# Patient Record
Sex: FEMALE | Race: AMERICAN INDIAN OR ALASKA NATIVE
[De-identification: names, ages, dates, MRNs, and addresses within clinical notes are randomized per-mention and may not be internally consistent; named-entity substitution may affect disease eponyms.]

---

## 2022-06-07 ENCOUNTER — HOSPITAL ENCOUNTER (EMERGENCY)
Dept: HOSPITAL 5 - ED | Age: 57
Discharge: HOME | End: 2022-06-07
Payer: SELF-PAY

## 2022-06-07 VITALS — DIASTOLIC BLOOD PRESSURE: 75 MMHG | SYSTOLIC BLOOD PRESSURE: 156 MMHG

## 2022-06-07 DIAGNOSIS — Z98.890: ICD-10-CM

## 2022-06-07 DIAGNOSIS — Z88.1: ICD-10-CM

## 2022-06-07 DIAGNOSIS — Z00.00: Primary | ICD-10-CM

## 2022-06-07 DIAGNOSIS — I26.99: ICD-10-CM

## 2022-06-07 DIAGNOSIS — I82.409: ICD-10-CM

## 2022-06-07 PROCEDURE — 99281 EMR DPT VST MAYX REQ PHY/QHP: CPT

## 2022-06-07 NOTE — EMERGENCY DEPARTMENT REPORT
ED Recheck HPI





- General


Chief Complaint: Eye Problems


Stated Complaint: REF BY MONICA EYES NON ACTIVE TO LIGHT


Time Seen by Provider: 06/07/22 14:27


Source: patient


Mode of arrival: Ambulatory


Limitations: No Limitations





- History of Present Illness


Initial Comments: 





Patient is sent to the emergency room by the plasma donation clinic because her 

pupils did not react on their exam.





Home medications none.  Denies alcohol or drugs.  Denies any head trauma.





Patient states she went to donate and they could not get her pupils to react.  

Patient has dark brown eyes and dark pupils, she is -American.





- Related Data


                                  Previous Rx's











 Medication  Instructions  Recorded  Last Taken  Type


 


Permethrin 5% [Acticin 5% CREAM] 1 applicatio TP ONCE #60 gram 12/20/13 06/16/14

Rx


 


Cefuroxime Axetil [Ceftin] 500 mg PO Q12H #20 tablet 07/11/14 Unknown Rx


 


Pantoprazole [Protonix TAB] 40 mg PO DAILY #30 tablet 07/11/14 Unknown Rx


 


Sucralfate [Carafate] 1 gm PO ACHS #30 tablet 07/11/14 Unknown Rx


 


Warfarin [Coumadin] 6 mg PO DAILY@1700 #30 tablet 07/11/14 Unknown Rx


 


Ibuprofen [Motrin 600 MG tab] 600 mg PO Q8H PRN #30 tablet 08/27/17 Unknown Rx


 


Itraconazole [Onmel] 200 mg PO QDAY #7 tab 08/27/17 Unknown Rx


 


Ketoconazole (Nf) [Ketoconazole 120 ml TP 1XW #1 shampoo 02/07/18 Unknown Rx





Shampoo (Nf)]    


 


predniSONE [Deltasone] 60 mg PO QDAY 5 Days #15 tab 02/07/18 Unknown Rx


 


Apixaban [Eliquis] 5 mg PO Q12H 14 Days #74 tablet 09/06/18 Unknown Rx


 


oxyCODONE /ACETAMINOPHEN [Percocet 1 tab PO Q6HR PRN #12 tablet 09/06/18 Unknown

 Rx





5/325 mg]    


 


Benzonatate [Tessalon Perles] 100 mg PO Q8HR PRN #10 capsule 08/29/20 Unknown Rx


 


Ibuprofen [Motrin 600 MG tab] 600 mg PO Q8H PRN #14 tablet 08/29/20 Unknown Rx


 


Prednisone [predniSONE 10 mg 10 mg PO .TAPER #1 tab.ds.pk 08/29/20 Unknown Rx





(6-Day Pack, 21 Tabs)]    


 


traMADoL [Ultram 50 MG tab] 50 mg PO Q6HR PRN #10 tablet 08/29/20 Unknown Rx











                                    Allergies











Allergy/AdvReac Type Severity Reaction Status Date / Time


 


metronidazole [From Flagyl] Allergy  Vomiting Verified 06/28/14 18:59


 


Metronidazole HCl Allergy  Vomiting Verified 10/20/13 04:26





[From Flagyl]     














ED Review of Systems


ROS: 


Stated complaint: REF BY MONICA EYES NON ACTIVE TO LIGHT


Other details as noted in HPI





Comment: All other systems reviewed and negative





ED Past Medical Hx





- Past Medical History


Previous Medical History?: Yes


Hx Hypertension: No


Hx Congestive Heart Failure: No


Hx Diabetes: No


Hx Deep Vein Thrombosis: Yes


Hx Pulmonary Embolism: Yes


Hx Liver Disease: No


Hx Renal Disease: No


Hx Seizures: No


Hx Asthma: No


Hx COPD: No


Additional medical history: DVT





- Surgical History


Past Surgical History?: Yes


Additional Surgical History: csection.  tubal ligation.  IVC filter 20 years ago

that got lost in her body





- Family History


Family history: no significant





- Social History


Smoking Status: Never Smoker


Substance Use Type: None





- Medications


Home Medications: 


                                Home Medications











 Medication  Instructions  Recorded  Confirmed  Last Taken  Type


 


Permethrin 5% [Acticin 5% CREAM] 1 applicatio TP ONCE #60 gram 12/20/13 06/29/14 06/16/14 Rx


 


Cefuroxime Axetil [Ceftin] 500 mg PO Q12H #20 tablet 07/11/14  Unknown Rx


 


Pantoprazole [Protonix TAB] 40 mg PO DAILY #30 tablet 07/11/14  Unknown Rx


 


Sucralfate [Carafate] 1 gm PO ACHS #30 tablet 07/11/14  Unknown Rx


 


Warfarin [Coumadin] 6 mg PO DAILY@1700 #30 tablet 07/11/14  Unknown Rx


 


Ibuprofen [Motrin 600 MG tab] 600 mg PO Q8H PRN #30 tablet 08/27/17  Unknown Rx


 


Itraconazole [Onmel] 200 mg PO QDAY #7 tab 08/27/17  Unknown Rx


 


Ketoconazole (Nf) [Ketoconazole 120 ml TP 1XW #1 shampoo 02/07/18  Unknown Rx





Shampoo (Nf)]     


 


predniSONE [Deltasone] 60 mg PO QDAY 5 Days #15 tab 02/07/18  Unknown Rx


 


Apixaban [Eliquis] 5 mg PO Q12H 14 Days #74 tablet 09/06/18  Unknown Rx


 


oxyCODONE /ACETAMINOPHEN [Percocet 1 tab PO Q6HR PRN #12 tablet 09/06/18  

Unknown Rx





5/325 mg]     


 


Benzonatate [Tessalon Perles] 100 mg PO Q8HR PRN #10 capsule 08/29/20  Unknown 

Rx


 


Ibuprofen [Motrin 600 MG tab] 600 mg PO Q8H PRN #14 tablet 08/29/20  Unknown Rx


 


Prednisone [predniSONE 10 mg 10 mg PO .TAPER #1 tab.ds.pk 08/29/20  Unknown Rx





(6-Day Pack, 21 Tabs)]     


 


traMADoL [Ultram 50 MG tab] 50 mg PO Q6HR PRN #10 tablet 08/29/20  Unknown Rx














ED Physical Exam





- General


Limitations: No Limitations


General appearance: alert, in no apparent distress





- Head


Head exam: Present: atraumatic, normocephalic





- Eye


Eye exam: Present: normal appearance





- ENT


ENT exam: Present: mucous membranes moist





- Neck


Neck exam: Present: normal inspection





- Respiratory


Respiratory exam: Present: normal lung sounds bilaterally.  Absent: respiratory 

distress





- Cardiovascular


Cardiovascular Exam: Present: regular rate, normal rhythm.  Absent: systolic 

murmur, diastolic murmur, rubs, gallop





- GI/Abdominal


GI/Abdominal exam: Present: soft, normal bowel sounds





- Extremities Exam


Extremities exam: Present: normal inspection





- Back Exam


Back exam: Present: normal inspection





- Neurological Exam


Neurological exam: Present: alert, oriented X3





- Psychiatric


Psychiatric exam: Present: normal affect, normal mood





- Skin


Skin exam: Present: warm, dry, intact, normal color.  Absent: rash





ED Course


                                   Vital Signs











  06/07/22





  13:27


 


Temperature 97.9 F


 


Pulse Rate 70


 


Respiratory 16





Rate 


 


Blood Pressure 156/75





[Left] 


 


O2 Sat by Pulse 99





Oximetry 














ED Recheck MDM





- Core Measures


Measure Exclusions: not indicated





- Medical Decision Making








                                   Vital Signs











  06/07/22





  13:27


 


Temperature 97.9 F


 


Pulse Rate 70


 


Respiratory 16





Rate 


 


Blood Pressure 156/75





[Left] 


 


O2 Sat by Pulse 99





Oximetry 








Pupils equal round react to light bilaterally.





No focal neurodeficit.





Patient being discharged home with discharge plan of care including diet, 

activity, medications and follow-up.








Critical care attestation.: 


If time is entered above; I have spent that time in minutes in the direct care 

of this critically ill patient, excluding procedure time.








ED Disposition


Clinical Impression: 


 Wellness examination





Disposition: 01 HOME / SELF CARE / HOMELESS


Is pt being admited?: No


Does the pt Need Aspirin: No


Condition: Stable


Referrals: 


ASTRID ESQUIVEL MD [Staff Physician] - 3-5 Days


Time of Disposition: 14:28